# Patient Record
Sex: FEMALE | Race: OTHER | HISPANIC OR LATINO | URBAN - METROPOLITAN AREA
[De-identification: names, ages, dates, MRNs, and addresses within clinical notes are randomized per-mention and may not be internally consistent; named-entity substitution may affect disease eponyms.]

---

## 2017-05-08 ENCOUNTER — OUTPATIENT (OUTPATIENT)
Dept: OUTPATIENT SERVICES | Facility: HOSPITAL | Age: 52
LOS: 1 days | Discharge: HOME | End: 2017-05-08

## 2017-06-28 DIAGNOSIS — N63 UNSPECIFIED LUMP IN BREAST: ICD-10-CM

## 2017-07-07 ENCOUNTER — TRANSCRIPTION ENCOUNTER (OUTPATIENT)
Age: 52
End: 2017-07-07

## 2022-08-24 ENCOUNTER — EMERGENCY (EMERGENCY)
Facility: HOSPITAL | Age: 57
LOS: 1 days | Discharge: HOME | End: 2022-08-24
Attending: EMERGENCY MEDICINE | Admitting: EMERGENCY MEDICINE
Payer: COMMERCIAL

## 2022-08-24 VITALS
OXYGEN SATURATION: 97 % | DIASTOLIC BLOOD PRESSURE: 79 MMHG | RESPIRATION RATE: 18 BRPM | TEMPERATURE: 98 F | WEIGHT: 169.98 LBS | HEART RATE: 78 BPM | SYSTOLIC BLOOD PRESSURE: 122 MMHG | HEIGHT: 62 IN

## 2022-08-24 VITALS
RESPIRATION RATE: 18 BRPM | TEMPERATURE: 98 F | OXYGEN SATURATION: 98 % | DIASTOLIC BLOOD PRESSURE: 71 MMHG | SYSTOLIC BLOOD PRESSURE: 147 MMHG | HEART RATE: 68 BPM

## 2022-08-24 DIAGNOSIS — E03.9 HYPOTHYROIDISM, UNSPECIFIED: ICD-10-CM

## 2022-08-24 DIAGNOSIS — E78.5 HYPERLIPIDEMIA, UNSPECIFIED: ICD-10-CM

## 2022-08-24 DIAGNOSIS — K21.9 GASTRO-ESOPHAGEAL REFLUX DISEASE WITHOUT ESOPHAGITIS: ICD-10-CM

## 2022-08-24 DIAGNOSIS — Z91.018 ALLERGY TO OTHER FOODS: ICD-10-CM

## 2022-08-24 DIAGNOSIS — R07.89 OTHER CHEST PAIN: ICD-10-CM

## 2022-08-24 DIAGNOSIS — M06.9 RHEUMATOID ARTHRITIS, UNSPECIFIED: ICD-10-CM

## 2022-08-24 DIAGNOSIS — Z20.822 CONTACT WITH AND (SUSPECTED) EXPOSURE TO COVID-19: ICD-10-CM

## 2022-08-24 LAB
ALBUMIN SERPL ELPH-MCNC: 4.6 G/DL — SIGNIFICANT CHANGE UP (ref 3.3–5)
ALP SERPL-CCNC: 69 U/L — SIGNIFICANT CHANGE UP (ref 40–120)
ALT FLD-CCNC: 18 U/L — SIGNIFICANT CHANGE UP (ref 10–45)
ANION GAP SERPL CALC-SCNC: 11 MMOL/L — SIGNIFICANT CHANGE UP (ref 5–17)
APTT BLD: 29.3 SEC — SIGNIFICANT CHANGE UP (ref 27.5–35.5)
AST SERPL-CCNC: 20 U/L — SIGNIFICANT CHANGE UP (ref 10–40)
BASOPHILS # BLD AUTO: 0.07 K/UL — SIGNIFICANT CHANGE UP (ref 0–0.2)
BASOPHILS NFR BLD AUTO: 1 % — SIGNIFICANT CHANGE UP (ref 0–2)
BILIRUB SERPL-MCNC: 0.4 MG/DL — SIGNIFICANT CHANGE UP (ref 0.2–1.2)
BUN SERPL-MCNC: 9 MG/DL — SIGNIFICANT CHANGE UP (ref 7–23)
CALCIUM SERPL-MCNC: 9.8 MG/DL — SIGNIFICANT CHANGE UP (ref 8.4–10.5)
CHLORIDE SERPL-SCNC: 103 MMOL/L — SIGNIFICANT CHANGE UP (ref 96–108)
CK MB CFR SERPL CALC: 1.7 NG/ML — SIGNIFICANT CHANGE UP (ref 0–6.7)
CK SERPL-CCNC: 163 U/L — SIGNIFICANT CHANGE UP (ref 25–170)
CO2 SERPL-SCNC: 28 MMOL/L — SIGNIFICANT CHANGE UP (ref 22–31)
CREAT SERPL-MCNC: 0.83 MG/DL — SIGNIFICANT CHANGE UP (ref 0.5–1.3)
EGFR: 82 ML/MIN/1.73M2 — SIGNIFICANT CHANGE UP
EOSINOPHIL # BLD AUTO: 0.07 K/UL — SIGNIFICANT CHANGE UP (ref 0–0.5)
EOSINOPHIL NFR BLD AUTO: 1 % — SIGNIFICANT CHANGE UP (ref 0–6)
GLUCOSE SERPL-MCNC: 101 MG/DL — HIGH (ref 70–99)
HCT VFR BLD CALC: 41.9 % — SIGNIFICANT CHANGE UP (ref 34.5–45)
HGB BLD-MCNC: 13.6 G/DL — SIGNIFICANT CHANGE UP (ref 11.5–15.5)
IMM GRANULOCYTES NFR BLD AUTO: 1 % — SIGNIFICANT CHANGE UP (ref 0–1.5)
INR BLD: 0.95 — SIGNIFICANT CHANGE UP (ref 0.88–1.16)
LYMPHOCYTES # BLD AUTO: 1.87 K/UL — SIGNIFICANT CHANGE UP (ref 1–3.3)
LYMPHOCYTES # BLD AUTO: 26.4 % — SIGNIFICANT CHANGE UP (ref 13–44)
MCHC RBC-ENTMCNC: 29.4 PG — SIGNIFICANT CHANGE UP (ref 27–34)
MCHC RBC-ENTMCNC: 32.5 GM/DL — SIGNIFICANT CHANGE UP (ref 32–36)
MCV RBC AUTO: 90.5 FL — SIGNIFICANT CHANGE UP (ref 80–100)
MONOCYTES # BLD AUTO: 0.61 K/UL — SIGNIFICANT CHANGE UP (ref 0–0.9)
MONOCYTES NFR BLD AUTO: 8.6 % — SIGNIFICANT CHANGE UP (ref 2–14)
NEUTROPHILS # BLD AUTO: 4.4 K/UL — SIGNIFICANT CHANGE UP (ref 1.8–7.4)
NEUTROPHILS NFR BLD AUTO: 62 % — SIGNIFICANT CHANGE UP (ref 43–77)
NRBC # BLD: 0 /100 WBCS — SIGNIFICANT CHANGE UP (ref 0–0)
NT-PROBNP SERPL-SCNC: 19 PG/ML — SIGNIFICANT CHANGE UP (ref 0–300)
PLATELET # BLD AUTO: 302 K/UL — SIGNIFICANT CHANGE UP (ref 150–400)
POTASSIUM SERPL-MCNC: 4.5 MMOL/L — SIGNIFICANT CHANGE UP (ref 3.5–5.3)
POTASSIUM SERPL-SCNC: 4.5 MMOL/L — SIGNIFICANT CHANGE UP (ref 3.5–5.3)
PROT SERPL-MCNC: 7.6 G/DL — SIGNIFICANT CHANGE UP (ref 6–8.3)
PROTHROM AB SERPL-ACNC: 11.3 SEC — SIGNIFICANT CHANGE UP (ref 10.5–13.4)
RBC # BLD: 4.63 M/UL — SIGNIFICANT CHANGE UP (ref 3.8–5.2)
RBC # FLD: 13.1 % — SIGNIFICANT CHANGE UP (ref 10.3–14.5)
SARS-COV-2 RNA SPEC QL NAA+PROBE: NEGATIVE — SIGNIFICANT CHANGE UP
SODIUM SERPL-SCNC: 142 MMOL/L — SIGNIFICANT CHANGE UP (ref 135–145)
TROPONIN T SERPL-MCNC: 0.01 NG/ML — SIGNIFICANT CHANGE UP (ref 0–0.01)
WBC # BLD: 7.09 K/UL — SIGNIFICANT CHANGE UP (ref 3.8–10.5)
WBC # FLD AUTO: 7.09 K/UL — SIGNIFICANT CHANGE UP (ref 3.8–10.5)

## 2022-08-24 PROCEDURE — 99285 EMERGENCY DEPT VISIT HI MDM: CPT | Mod: 25

## 2022-08-24 PROCEDURE — 36415 COLL VENOUS BLD VENIPUNCTURE: CPT

## 2022-08-24 PROCEDURE — 71045 X-RAY EXAM CHEST 1 VIEW: CPT

## 2022-08-24 PROCEDURE — 96374 THER/PROPH/DIAG INJ IV PUSH: CPT

## 2022-08-24 PROCEDURE — 83880 ASSAY OF NATRIURETIC PEPTIDE: CPT

## 2022-08-24 PROCEDURE — 71045 X-RAY EXAM CHEST 1 VIEW: CPT | Mod: 26

## 2022-08-24 PROCEDURE — 83690 ASSAY OF LIPASE: CPT

## 2022-08-24 PROCEDURE — 85730 THROMBOPLASTIN TIME PARTIAL: CPT

## 2022-08-24 PROCEDURE — 80053 COMPREHEN METABOLIC PANEL: CPT

## 2022-08-24 PROCEDURE — 85610 PROTHROMBIN TIME: CPT

## 2022-08-24 PROCEDURE — 87635 SARS-COV-2 COVID-19 AMP PRB: CPT

## 2022-08-24 PROCEDURE — 85379 FIBRIN DEGRADATION QUANT: CPT

## 2022-08-24 PROCEDURE — 93010 ELECTROCARDIOGRAM REPORT: CPT | Mod: 59

## 2022-08-24 PROCEDURE — 82550 ASSAY OF CK (CPK): CPT

## 2022-08-24 PROCEDURE — 84484 ASSAY OF TROPONIN QUANT: CPT

## 2022-08-24 PROCEDURE — 82553 CREATINE MB FRACTION: CPT

## 2022-08-24 PROCEDURE — 85025 COMPLETE CBC W/AUTO DIFF WBC: CPT

## 2022-08-24 PROCEDURE — 96375 TX/PRO/DX INJ NEW DRUG ADDON: CPT

## 2022-08-24 PROCEDURE — 93005 ELECTROCARDIOGRAM TRACING: CPT

## 2022-08-24 RX ORDER — FAMOTIDINE 10 MG/ML
20 INJECTION INTRAVENOUS ONCE
Refills: 0 | Status: COMPLETED | OUTPATIENT
Start: 2022-08-24 | End: 2022-08-24

## 2022-08-24 RX ORDER — ONDANSETRON 8 MG/1
4 TABLET, FILM COATED ORAL ONCE
Refills: 0 | Status: COMPLETED | OUTPATIENT
Start: 2022-08-24 | End: 2022-08-24

## 2022-08-24 RX ADMIN — ONDANSETRON 4 MILLIGRAM(S): 8 TABLET, FILM COATED ORAL at 17:30

## 2022-08-24 RX ADMIN — FAMOTIDINE 20 MILLIGRAM(S): 10 INJECTION INTRAVENOUS at 17:30

## 2022-08-24 NOTE — ED ADULT TRIAGE NOTE - CHIEF COMPLAINT QUOTE
Pt c/o constant chest tightness and SOB since this morning. Pt seen at  and given 324mg of Aspirin. Pt denies fevers, chills, n/v, weakness, numbness, tingling. Hx of hypothyroid

## 2022-08-24 NOTE — ED PROVIDER NOTE - PATIENT PORTAL LINK FT
You can access the FollowMyHealth Patient Portal offered by [facility] by registering at the following website: [email or web address]. By joining [employer]’s FollowMyHealth portal, you will also be able to view your health information using other applications (apps) compatible with our system.

## 2022-08-24 NOTE — ED PROVIDER NOTE - PROGRESS NOTE DETAILS
Pt feels better after meds, Labs including trop and DD neg. Do not suspect ACS, PE, dissection or other acute life-threatening pathology at this time.  Sx likely due to gerd/gastritis. Pt given dietary and lifestyle modifications for GERD and recommend f/u with PMD and GI for possible endoscopy. Recommend pepcid as well.   Pt feeling improved and is stable for DC. ED evaluation and management discussed with the patient in detail.  Close PMD follow up encouraged.  Strict ED return instructions discussed in detail and patient given the opportunity to ask any questions about their discharge diagnosis and instructions. Patient verbalized understanding.

## 2022-08-24 NOTE — ED PROVIDER NOTE - OBJECTIVE STATEMENT
56F with a h/o 56F with a h/o acid reflux, hypothyroidism, RA (not on meds currently), borderline HLD, who p/w substernal chest pressure with radiation to her back since this morning when she woke up, no radiation to arms or neck, no SOB, no n/t/w in ext, no dizziness or syncope, no f/c. Pt went to urgent care and was given 324 asa and sent to ER for further eval. No hx or FHx of CAD/VTE. Pt denies previous stress test. Currently reports pain is 7/10.

## 2022-08-24 NOTE — ED ADULT NURSE NOTE - OBJECTIVE STATEMENT
56 y.o female a&ox3, speaking in full sentences, no acute distress. Pt reports to Ed c/o midsternal chest tightness x today. pt states she thought maybe it was her asthma, did not feel relief. Went to UC no wheezing present per pt, given 324mg of aspirin and told to come to ED. Pt denies sob at present, n/v, dizziness, headache.

## 2022-08-24 NOTE — ED PROVIDER NOTE - NSFOLLOWUPINSTRUCTIONS_ED_ALL_ED_FT
Please follow up with your primary care physician and a GI specialist.   Please take pepcid 40mg at night to help with your symptoms. Read dietary and lifestyle instructions below.     Return to the Emergency Department if you have any new or worsening symptoms, or for any other concerns. Please read below for further information.    Gastroesophageal Reflux Disease, Adult  Gastroesophageal reflux (EULA) happens when acid from the stomach flows up into the tube that connects the mouth and the stomach (esophagus). Normally, food travels down the esophagus and stays in the stomach to be digested. With EULA, food and stomach acid sometimes move back up into the esophagus. You may have a disease called gastroesophageal reflux disease (GERD) if the reflux:  Happens often. Causes frequent or very bad symptoms. Causes problems such as damage to the esophagus. When this happens, the esophagus becomes sore and swollen (inflamed). Over time, GERD can make small holes (ulcers) in the lining of the esophagus.  What are the causes?  This condition is caused by a problem with the muscle between the esophagus and the stomach. When this muscle is weak or not normal, it does not close properly to keep food and acid from coming back up from the stomach. The muscle can be weak because of:  Tobacco use. Pregnancy. Having a certain type of hernia (hiatal hernia).Alcohol use. Certain foods and drinks, such as coffee, chocolate, onions, and peppermint. What increases the risk?  You are more likely to develop this condition if you:  Are overweight. Have a disease that affects your connective tissue. Use NSAID medicines. What are the signs or symptoms?  Symptoms of this condition include:  Heartburn. Difficult or painful swallowing. The feeling of having a lump in the throat. A bitter taste in the mouth. Bad breath. Having a lot of saliva. Having an upset or bloated stomach. Belching. Chest pain. Different conditions can cause chest pain. Make sure you see your doctor if you have chest pain. Shortness of breath or noisy breathing (wheezing).Ongoing (chronic) cough or a cough at night. Wearing away of the surface of teeth (tooth enamel).Weight loss. How is this treated?  Treatment will depend on how bad your symptoms are. Your doctor may suggest:  Changes to your diet. Medicine. Surgery. Follow these instructions at home:  Eating and drinking        Follow a diet as told by your doctor. You may need to avoid foods and drinks such as:  Coffee and tea (with or without caffeine).Drinks that contain alcohol. Energy drinks and sports drinks. Bubbly (carbonated) drinks or sodas. Chocolate and cocoa. Peppermint and mint flavorings. Garlic and onions. Horseradish. Spicy and acidic foods. These include peppers, chili powder, meyer powder, vinegar, hot sauces, and BBQ sauce. Citrus fruit juices and citrus fruits, such as oranges, macarena, and limes. Tomato-based foods. These include red sauce, chili, salsa, and pizza with red sauce. Fried and fatty foods. These include donuts, french fries, potato chips, and high-fat dressings. High-fat meats. These include hot dogs, rib eye steak, sausage, ham, and pulido. High-fat dairy items, such as whole milk, butter, and cream cheese. Eat small meals often. Avoid eating large meals. Avoid drinking large amounts of liquid with your meals. Avoid eating meals during the 2–3 hours before bedtime. Avoid lying down right after you eat. Do not exercise right after you eat. Lifestyle        Do not use any products that contain nicotine or tobacco. These include cigarettes, e-cigarettes, and chewing tobacco. If you need help quitting, ask your doctor. Try to lower your stress. If you need help doing this, ask your doctor. If you are overweight, lose an amount of weight that is healthy for you. Ask your doctor about a safe weight loss goal. General instructions     Pay attention to any changes in your symptoms. Take over-the-counter and prescription medicines only as told by your doctor. Do not take aspirin, ibuprofen, or other NSAIDs unless your doctor says it is okay. Wear loose clothes. Do not wear anything tight around your waist. Raise (elevate) the head of your bed about 6 inches (15 cm).Avoid bending over if this makes your symptoms worse. Keep all follow-up visits as told by your doctor. This is important. Contact a doctor if:  You have new symptoms. You lose weight and you do not know why. You have trouble swallowing or it hurts to swallow. You have wheezing or a cough that keeps happening .Your symptoms do not get better with treatment. You have a hoarse voice .Get help right away if:  You have pain in your arms, neck, jaw, teeth, or back. You feel sweaty, dizzy, or light-headed. You have chest pain or shortness of breath. You throw up (vomit) and your throw-up looks like blood or coffee grounds. You pass out (faint).Your poop (stool) is bloody or black. You cannot swallow, drink, or eat. Summary  If a person has gastroesophageal reflux disease (GERD), food and stomach acid move back up into the esophagus and cause symptoms or problems such as damage to the esophagus. Treatment will depend on how bad your symptoms are. Follow a diet as told by your doctor. Take all medicines only as told by your doctor. This information is not intended to replace advice given to you by your health care provider. Make sure you discuss any questions you have with your health care provider.  Food Choices for Gastroesophageal Reflux Disease, Adult  When you have gastroesophageal reflux disease (GERD), the foods you eat and your eating habits are very important. Choosing the right foods can help ease your discomfort. Think about working with a nutrition specialist (dietitian) to help you make good choices.  What are tips for following this plan?     Meals     Choose healthy foods that are low in fat, such as fruits, vegetables, whole grains, low-fat dairy products, and lean meat, fish, and poultry. Eat small meals often instead of 3 large meals a day. Eat your meals slowly, and in a place where you are relaxed. Avoid bending over or lying down until 2–3 hours after eating. Avoid eating meals 2–3 hours before bed. Avoid drinking a lot of liquid with meals. Cook foods using methods other than frying. Bake, grill, or broil food instead. Avoid or limit:  Chocolate. Peppermint or spearmint. Alcohol. Pepper. Black and decaffeinated coffee. Black and decaffeinated tea. Bubbly (carbonated) soft drinks. Caffeinated energy drinks and soft drinks. Limit high-fat foods such as:  Fatty meat or fried foods. Whole milk, cream, butter, or ice cream. Nuts and nut butters. Pastries, donuts, and sweets made with butter or shortening. Avoid foods that cause symptoms. These foods may be different for everyone. Common foods that cause symptoms include:  Tomatoes. Oranges, macarena, and limes. Peppers. Spicy food. Onions and garlic. Vinegar. Lifestyle     Maintain a healthy weight. Ask your doctor what weight is healthy for you. If you need to lose weight, work with your doctor to do so safely. Exercise for at least 30 minutes for 5 or more days each week, or as told by your doctor. Wear loose-fitting clothes. Do not smoke. If you need help quitting, ask your doctor. Sleep with the head of your bed higher than your feet. Use a wedge under the mattress or blocks under the bed frame to raise the head of the bed. Summary  When you have gastroesophageal reflux disease (GERD), food and lifestyle choices are very important in easing your symptoms. Eat small meals often instead of 3 large meals a day. Eat your meals slowly, and in a place where you are relaxed. Limit high-fat foods such as fatty meat or fried foods. Avoid bending over or lying down until 2–3 hours after eating. Avoid peppermint and spearmint, caffeine, alcohol, and chocolate. This information is not intended to replace advice given to you by your health care provider. Make sure you discuss any questions you have with your health care provider.

## 2022-08-24 NOTE — ED PROVIDER NOTE - CLINICAL SUMMARY MEDICAL DECISION MAKING FREE TEXT BOX
56F with a h/o acid reflux, hypothyroidism, RA (not on meds currently), borderline HLD, who p/w substernal chest pressure with radiation to her back since this morning when she woke up, assoc with nausea, no radiation to arms or neck, no SOB, no n/t/w in ext, no dizziness or syncope, no f/c. Pt went to urgent care and was given 324 asa and sent to ER for further eval. No hx or FHx of CAD/VTE. Pt denies previous stress test. Currently reports pain is 7/10.  Pt is well-appearing on exam, VSS, no focal neuro deficits, EKG NSR, no ischemia   Plan for labs including CE and trop, CXR, will given zofran and pepcid for sx. dispo pending w/u

## 2024-02-16 NOTE — ED ADULT NURSE NOTE - CAS TRG GENERAL [employer], MLM
Received message from Rheumatology  that the patient had some questions regarding medications.      Called patient, left message with Rheumatology phone number to call back.       Lauren Levine RN       [employer]